# Patient Record
Sex: FEMALE | URBAN - METROPOLITAN AREA
[De-identification: names, ages, dates, MRNs, and addresses within clinical notes are randomized per-mention and may not be internally consistent; named-entity substitution may affect disease eponyms.]

---

## 2020-01-14 ENCOUNTER — APPOINTMENT (OUTPATIENT)
Dept: URBAN - METROPOLITAN AREA CLINIC 232 | Age: 20
Setting detail: DERMATOLOGY
End: 2020-01-15

## 2020-01-14 DIAGNOSIS — L23.2 ALLERGIC CONTACT DERMATITIS DUE TO COSMETICS: ICD-10-CM

## 2020-01-14 DIAGNOSIS — L30.8 OTHER SPECIFIED DERMATITIS: ICD-10-CM

## 2020-01-14 DIAGNOSIS — L70.0 ACNE VULGARIS: ICD-10-CM

## 2020-01-14 PROCEDURE — OTHER COUNSELING: OTHER

## 2020-01-14 PROCEDURE — 99213 OFFICE O/P EST LOW 20 MIN: CPT

## 2020-01-14 PROCEDURE — OTHER PRESCRIPTION: OTHER

## 2020-01-14 RX ORDER — TRIAMCINOLONE ACETONIDE 1 MG/G
CREAM TOPICAL
Qty: 1 | Refills: 0 | Status: ERX | COMMUNITY
Start: 2020-01-14

## 2020-01-14 ASSESSMENT — SEVERITY ASSESSMENT: SEVERITY: MILD

## 2020-01-14 ASSESSMENT — LOCATION DETAILED DESCRIPTION DERM
LOCATION DETAILED: LEFT ULNAR DORSAL HAND
LOCATION DETAILED: LEFT CENTRAL BUCCAL CHEEK
LOCATION DETAILED: RIGHT RADIAL DORSAL HAND
LOCATION DETAILED: LEFT SUPERIOR CENTRAL MALAR CHEEK

## 2020-01-14 ASSESSMENT — LOCATION SIMPLE DESCRIPTION DERM
LOCATION SIMPLE: LEFT CHEEK
LOCATION SIMPLE: LEFT HAND
LOCATION SIMPLE: RIGHT HAND

## 2020-01-14 ASSESSMENT — SEVERITY ASSESSMENT OVERALL AMONG ALL PATIENTS
IN YOUR EXPERIENCE, AMONG ALL PATIENTS YOU HAVE SEEN WITH THIS CONDITION, HOW SEVERE IS THIS PATIENT'S CONDITION?: ALMOST CLEAR

## 2020-01-14 ASSESSMENT — LOCATION ZONE DERM
LOCATION ZONE: HAND
LOCATION ZONE: FACE

## 2020-01-14 NOTE — PROCEDURE: COUNSELING
Bactrim Counseling:  I discussed with the patient the risks of sulfa antibiotics including but not limited to GI upset, allergic reaction, drug rash, diarrhea, dizziness and yeast infections.  Rarely, more serious reactions can occur including desquamative/blistering drug rashes.

## 2020-01-14 NOTE — PROCEDURE: COUNSELING
Patient Specific Counseling (Will Not Stick From Patient To Patient): Pt has only has irritation for about 1 week. Suggest pt keep an open mind about occult contacts. She uses Tret 0.1% on her face and perhaps moved on to her left lower eyelid/infraorbital area by way of her pillow. She will use OTC HC cream bid for 1-2 weeks. Pt has had irritant contact derm from poss Tret in the past.

## 2020-01-14 NOTE — PROCEDURE: COUNSELING
Patient Specific Counseling (Will Not Stick From Patient To Patient): Pt admits she has had irregular cycles but it is not bothersome enough for pt to decrease or stop Pierce. She is very pleased with the status of her skin and would like to continue. She and her family are moving to [place], [place] so she wanted refills before she leaves. She knows she will need a new derm there. She has plenty of Tretinoin 0.1% cream for now and will call for refills. Patient Specific Counseling (Will Not Stick From Patient To Patient): Pt admits she has had irregular cycles but it is not bothersome enough for pt to decrease or stop Pierce. She is very pleased with the status of her skin and would like to continue. She and her family are moving to [place], [place] so she wanted refills before she leaves. She knows she will need a new derm there. She has plenty of Tretinoin 0.1% cream for now and will call for refills.

## 2020-01-14 NOTE — HPI: PIMPLES (ACNE)
What Type Of Note Output Would You Prefer (Optional)?: Standard Output
How Severe Is Your Acne?: moderate
Is This A New Presentation, Or A Follow-Up?: Follow Up Acne
Additional Comments (Use Complete Sentences): The patient is currently taking spirolactone 100mg 2 pills qd. The patient states that she has been experiencing abnormal periods every 2 weeks lasting 3 to 4 days but no other side effects reported. The patient does use 0.1 tretinoin cream Tiw and this regimen is working well for. The patient would like refills since she is moving to [place] in mid February.